# Patient Record
Sex: FEMALE | URBAN - METROPOLITAN AREA
[De-identification: names, ages, dates, MRNs, and addresses within clinical notes are randomized per-mention and may not be internally consistent; named-entity substitution may affect disease eponyms.]

---
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
## 2022-11-12 NOTE — PROGRESS NOTES
[address] and [facility]                                             [address] [facility], [address] [place], [place] [address], [address] [place] [address]        Phone: [phone]                                     Name:  Sree Cantrell  :    2000      Consultants:   Patient Care Team:  Sarah Dangelo DO as PCP - General (Family Medicine)    Chief Complaint:     Sree Cantrell is a 20 y.o. female  who presents today for a New Patient care visit with Personalized Prevention Plan Services as noted below. HPI:     Sree Cantrell is a 21 yo female who presents today to establish care. Anxiety/depression - takes zoloft 25 mg daily. Started in late 2018. Initially started more for depression but in the past year has started to have more anxiety symptoms. Had a life event that caused her a lot of stress and things improved once that resolved. Recently started new job and is just having a lot more anxiety. Does see therapist regularly. Interested in increasing dosage. Ear clogged - left ear x 3 weeks. Ears are chronically waxy. States this happened once before a little over a year ago. Worse then. Popped frequently then but not having as much of that this time. Feels like it has improved some since she first noticed it. Hearing still feels muffled. No drainage or sharp pain. Has tried ear drops on/off without much success. Also tried ear spray without much success. No other URI sxs.      OB/GYN hx  -   - Menarche: 12  - LMP: 21  - Menstrual Period: regular  - Interval Between Menses: once a month  - Duration of Menses: 6 days  - Menstrual Flow: heavy first 2 days then very light  - Bleeding between menses: no  - Pain: no   - Safe Enviroment: yes  - Sexually Active: yes, but never penetrative sex  - No sexual problems  - Contraception: no  - Last pap smear: never  - History of abnormal pap smears: never  - STI History: no concern   - Not sure if she had HPV vaccines      Past medical, surgical, family, and social history reviewed and documented as below. Patient Active Problem List   Diagnosis    Anxiety and depression         Past Medical History:    Past Medical History:   Diagnosis Date    Anxiety     Depression        Past Surgical History:  History reviewed. No pertinent surgical history. Home Meds:  Prior to Visit Medications    Medication Sig Taking?  Authorizing Provider   sertraline (ZOLOFT) 50 MG tablet Take 0.5 tablets by mouth daily Yes Kathe Polo, DO       Allergies:    Nickel    Family History:       Problem Relation Age of Onset    High Blood Pressure Mother     Gout Father     Pancreatic Cancer Maternal Grandmother         62s    Diabetes Maternal Grandfather     Breast Cancer Paternal Grandmother         62s    Ovarian Cancer Paternal Grandmother         62s       Social History:  Social History    None            Health Maintenance Completed:  Health Maintenance   Topic Date Due    HPV vaccine (1 - 2-dose series) Never done    Depression Monitoring  Never done    Pap smear  Never done    Hepatitis C screen  05/01/2023 (Originally 5/1/2017)    HIV screen  05/01/2023 (Originally 5/1/2014)    [address] screen  05/01/2023 (Originally 5/1/2015)    COVID-19 Vaccine (3 - Booster for Swanson Blacklake series) 11/14/2023 (Originally 6/16/2021)    DTaP/Tdap/Td vaccine (8 - Td or Tdap) 05/31/2032    Hepatitis A vaccine  Completed    Hib vaccine  Completed    Varicella vaccine  Completed    Meningococcal (ACWY) vaccine  Completed    Flu vaccine  Completed    Pneumococcal 0-64 years Vaccine  Aged Out          Immunization History   Administered Date(s) Administered    COVID-19, PFIZER PURPLE top, DILUTE for use, (age 15 y+), 30mcg/0.3mL 03/24/2021, 04/21/2021    DTaP, 5 Pertussis Antigens (Daptacel) 1999, 1999, 1999, 11/22/2000, 05/26/2004    Hep B/Hib (Comvax) 2000, 2000, 09/18/2000    Hepatitis A Ped/Adol (Havrix, Vaqta) 05/02/2016, 07/10/2017    Influenza, FLUCELVAX, (age 10 mo+), MDCK, PF, 0.5mL 12/07/2021, 10/31/2022    MMR 05/26/2000, 05/26/2004    Meningococcal MCV4P (Menactra) 05/02/2016    Meningococcal MPSV4 (Menomune) 07/01/2011    Pneumococcal Conjugate 7-valent (Tucker Porteous) 05/26/2000, 09/18/2000    Polio IPV (IPOL) 1999, 1999, 11/22/2000, 05/26/2004    Tdap (Boostrix, Adacel) 07/01/2011, 05/31/2022    Varicella (Varivax) 05/26/2000, 02/16/2009         Review of Systems:  Review of Systems   Constitutional:  Negative for activity change, appetite change, fatigue and fever. HENT:  Positive for hearing loss (L ear muffled). Negative for congestion, ear discharge, ear pain, postnasal drip, rhinorrhea and sinus pressure. Eyes:  Negative for pain and visual disturbance. Respiratory:  Negative for cough, chest tightness and shortness of breath. Cardiovascular:  Negative for chest pain, palpitations and leg swelling. Gastrointestinal:  Negative for abdominal pain, blood in stool, constipation, diarrhea, nausea and vomiting. Endocrine: Negative for polydipsia and polyuria. Genitourinary:  Negative for dysuria, flank pain and frequency. Musculoskeletal:  Negative for back pain and myalgias. Neurological:  Negative for dizziness and light-headedness. Psychiatric/Behavioral:  Negative for dysphoric mood and suicidal ideas. The patient is nervous/anxious. Physical Exam:   Vitals:    11/14/22 0836   BP: 122/82   Site: Left Upper Arm   Position: Sitting   Cuff Size: Large Adult   Pulse: 77   Temp: 97 °F (36.1 °C)   TempSrc: Temporal   SpO2: 97%   Weight: 203 lb 12.8 oz (92.4 kg)   Height: 5' 9.09\" (1.755 m)     Body mass index is 30.01 kg/m². Wt Readings from Last 3 Encounters:   11/14/22 203 lb 12.8 oz (92.4 kg)       BP Readings from Last 3 Encounters:   11/14/22 122/82       Physical Exam  Vitals reviewed. Constitutional:       General: She is not in acute distress.      Appearance: Normal appearance. HENT:      Head: Normocephalic and atraumatic. Right Ear: External ear normal. There is impacted cerumen. Left Ear: External ear normal. There is impacted cerumen. Nose: Nose normal.      Mouth/Throat:      Mouth: Mucous membranes are moist.      Pharynx: Oropharynx is clear. Eyes:      Extraocular Movements: Extraocular movements intact. Pupils: Pupils are equal, round, and reactive to light. Cardiovascular:      Rate and Rhythm: Normal rate and regular rhythm. Pulses: Normal pulses. Heart sounds: Normal heart sounds. Pulmonary:      Effort: Pulmonary effort is normal. No respiratory distress. Breath sounds: Normal breath sounds. Abdominal:      General: Bowel sounds are normal.      Palpations: Abdomen is soft. Tenderness: There is no abdominal tenderness. There is no guarding. Musculoskeletal:         General: No deformity. Cervical back: Neck supple. No tenderness. Right lower leg: No edema. Left lower leg: No edema. Lymphadenopathy:      Cervical: No cervical adenopathy. Skin:     General: Skin is warm and dry. Capillary Refill: Capillary refill takes less than 2 seconds. Findings: No lesion or rash. Neurological:      General: No focal deficit present. Mental Status: She is alert. Mental status is at baseline. Gait: Gait normal.   Psychiatric:         Mood and Affect: Mood normal.         Behavior: Behavior normal.         Thought Content: Thought content normal.         Judgment: Judgment normal.            Lab Review: not applicable       Assessment/Plan:  Nima Anaya was seen today for establish care and anxiety.     Diagnoses and all orders for this visit:    Encounter to establish care  -Medical history reviewed and documented  -Appears up-to-date on immunizations but unsure if she had HPV vaccine, will bring immunization records at visit next month to confirm  -STI testing deferred as patient is unconcerned and has not had penetrative sex at this time  -Pap smear to be completed at annual exam around birthday 5/1/2024    Anxiety and depression  -Depression well controlled, anxiety not well controlled  -PHQ-9 2, QUAN-7 7 today  -Increase zoloft to 50 mg daily   -F/u in 1 month for mood/med check  -     sertraline (ZOLOFT) 50 MG tablet; Take 0.5 tablets by mouth daily    Bilateral impacted cerumen  -Bilateral cerumen impaction noted on exam today  -Ears irrigated and cerumen removed bilaterally, patient tolerated well  -May trial of Debrox drops at home to help with wax buildup  -Follow-up as needed    Family history of breast cancer  Family history of ovarian cancer  -Paternal grandmother had a history of breast and ovarian cancer at the same time, in her 62s  -Patient denies any other family members with breast and/or ovarian cancer  -Referral to breast surgery given today for further evaluation of need for genetic testing  -     ANIBAL - Melchior, [place], DO, Breast Surgery, [facility]      Health Maintenance Due:  Health Maintenance Due   Topic Date Due    HPV vaccine (1 - 2-dose series) Never done    Depression Monitoring  Never done    Pap smear  Never done        Health care decision maker:  <72years old  Vot-ER:  asked and patient declined      Health Maintenance: (USPSTF Recommendations)  (F) Cervical Cancer Screen: (21-29 q3yr cytology alone; 30-65 q3yr cytology alone, q5yr with hrHPV alone, or q5yr cytology+hrHPV (A)): plan to complete with annual visit at birthday 5/2024  HIV Screen: (15-65 yr old, and all pregnant patients (A)): defer  Hep C Screen: (18-79 yr old (B)): defer  Immunizations: UTD except unsure of HPV vaccine status, will bring immunization records to f/u visit     RTC:  Return in about 4 weeks (around 12/12/2022) for mood/med check. EMR Dragon/transcription disclaimer:  Much of this encounter note is electronic transcription/translation of spoken language to printed texts.   The electronic translation of spoken language may be erroneous, or at times, nonsensical words or phrases may be inadvertently transcribed.   Although I have reviewed the note for such errors, some may still exist.       Shelley Castillo DO, PGY-2  [facility]

## 2022-11-14 ENCOUNTER — OFFICE VISIT (OUTPATIENT)
Dept: PRIMARY CARE CLINIC | Age: 22
End: 2022-11-14
Payer: COMMERCIAL

## 2022-11-14 VITALS
SYSTOLIC BLOOD PRESSURE: 122 MMHG | HEART RATE: 77 BPM | DIASTOLIC BLOOD PRESSURE: 82 MMHG | WEIGHT: 203.8 LBS | OXYGEN SATURATION: 97 % | HEIGHT: 69 IN | BODY MASS INDEX: 30.18 KG/M2 | TEMPERATURE: 97 F

## 2022-11-14 DIAGNOSIS — F41.9 ANXIETY AND DEPRESSION: Primary | ICD-10-CM

## 2022-11-14 DIAGNOSIS — F32.A ANXIETY AND DEPRESSION: Primary | ICD-10-CM

## 2022-11-14 DIAGNOSIS — Z76.89 ENCOUNTER TO ESTABLISH CARE: ICD-10-CM

## 2022-11-14 DIAGNOSIS — H61.23 BILATERAL IMPACTED CERUMEN: ICD-10-CM

## 2022-11-14 DIAGNOSIS — Z80.3 FAMILY HISTORY OF BREAST CANCER: ICD-10-CM

## 2022-11-14 DIAGNOSIS — Z80.41 FAMILY HISTORY OF OVARIAN CANCER: ICD-10-CM

## 2022-11-14 RX ORDER — SERTRALINE HYDROCHLORIDE 25 MG/1
25 TABLET, FILM COATED ORAL DAILY
COMMUNITY
Start: 2022-09-26 | End: 2022-11-14 | Stop reason: SDUPTHER

## 2022-11-14 SDOH — ECONOMIC STABILITY: FOOD INSECURITY: WITHIN THE PAST 12 MONTHS, YOU WORRIED THAT YOUR FOOD WOULD RUN OUT BEFORE YOU GOT MONEY TO BUY MORE.: NEVER TRUE

## 2022-11-14 SDOH — ECONOMIC STABILITY: FOOD INSECURITY: WITHIN THE PAST 12 MONTHS, THE FOOD YOU BOUGHT JUST DIDN'T LAST AND YOU DIDN'T HAVE MONEY TO GET MORE.: NEVER TRUE

## 2022-11-14 ASSESSMENT — PATIENT HEALTH QUESTIONNAIRE - PHQ9
8. MOVING OR SPEAKING SO SLOWLY THAT OTHER PEOPLE COULD HAVE NOTICED. OR THE OPPOSITE, BEING SO FIGETY OR RESTLESS THAT YOU HAVE BEEN MOVING AROUND A LOT MORE THAN USUAL: 0
SUM OF ALL RESPONSES TO PHQ9 QUESTIONS 1 & 2: 1
10. IF YOU CHECKED OFF ANY PROBLEMS, HOW DIFFICULT HAVE THESE PROBLEMS MADE IT FOR YOU TO DO YOUR WORK, TAKE CARE OF THINGS AT HOME, OR GET ALONG WITH OTHER PEOPLE: 1
1. LITTLE INTEREST OR PLEASURE IN DOING THINGS: 0
SUM OF ALL RESPONSES TO PHQ QUESTIONS 1-9: 2
5. POOR APPETITE OR OVEREATING: 1
SUM OF ALL RESPONSES TO PHQ QUESTIONS 1-9: 2
3. TROUBLE FALLING OR STAYING ASLEEP: 0
SUM OF ALL RESPONSES TO PHQ QUESTIONS 1-9: 2
7. TROUBLE CONCENTRATING ON THINGS, SUCH AS READING THE NEWSPAPER OR WATCHING TELEVISION: 0
2. FEELING DOWN, DEPRESSED OR HOPELESS: 1
SUM OF ALL RESPONSES TO PHQ QUESTIONS 1-9: 2
9. THOUGHTS THAT YOU WOULD BE BETTER OFF DEAD, OR OF HURTING YOURSELF: 0
6. FEELING BAD ABOUT YOURSELF - OR THAT YOU ARE A FAILURE OR HAVE LET YOURSELF OR YOUR FAMILY DOWN: 0
4. FEELING TIRED OR HAVING LITTLE ENERGY: 0

## 2022-11-14 ASSESSMENT — ANXIETY QUESTIONNAIRES
5. BEING SO RESTLESS THAT IT IS HARD TO SIT STILL: 0
GAD7 TOTAL SCORE: 7
2. NOT BEING ABLE TO STOP OR CONTROL WORRYING: 1
6. BECOMING EASILY ANNOYED OR IRRITABLE: 1
7. FEELING AFRAID AS IF SOMETHING AWFUL MIGHT HAPPEN: 0
4. TROUBLE RELAXING: 2
1. FEELING NERVOUS, ANXIOUS, OR ON EDGE: 1
3. WORRYING TOO MUCH ABOUT DIFFERENT THINGS: 2
IF YOU CHECKED OFF ANY PROBLEMS ON THIS QUESTIONNAIRE, HOW DIFFICULT HAVE THESE PROBLEMS MADE IT FOR YOU TO DO YOUR WORK, TAKE CARE OF THINGS AT HOME, OR GET ALONG WITH OTHER PEOPLE: SOMEWHAT DIFFICULT

## 2022-11-14 ASSESSMENT — ENCOUNTER SYMPTOMS
DIARRHEA: 0
ABDOMINAL PAIN: 0
CHEST TIGHTNESS: 0
SHORTNESS OF BREATH: 0
EYE PAIN: 0
SINUS PRESSURE: 0
NAUSEA: 0
VOMITING: 0
COUGH: 0
BACK PAIN: 0
BLOOD IN STOOL: 0
CONSTIPATION: 0
RHINORRHEA: 0

## 2022-11-14 ASSESSMENT — SOCIAL DETERMINANTS OF HEALTH (SDOH): HOW HARD IS IT FOR YOU TO PAY FOR THE VERY BASICS LIKE FOOD, HOUSING, MEDICAL CARE, AND HEATING?: NOT VERY HARD

## 2023-01-08 NOTE — PROGRESS NOTES
[address] and [facility]                                             [address] [facility], [address] [place], [place] [address], [address] [place] [address]        Phone: [phone]      Name:  Chacha Gipson  :    2000    Consultants:   Patient Care Team:  Ronen Bonilla DO as PCP - General (Family Medicine)    Chief Complaint:     Chacha Gipson is a 20 y.o. female  who presents today for an established patient care visit with Personalized Prevention Plan Services as noted below. HPI:     Chacha Gipson is a 20 y.o. female with anxiety/depression who presents today for mood/med check. At last visit, zoloft dosage increased to 50 mg daily for uncontrolled anxiety. No issues with increasing dose. Feels new dose has helped with anxiety and is appropriate. No increased stress or worsening of mood over the holidays. Continues to follow with therapist regularly. Applied to masters program and excited to potentially start that in the fall. PHQ-9 Total Score: 2 (2023  7:36 AM)  Thoughts that you would be better off dead, or of hurting yourself in some way: 0 (2023  7:36 AM)  QUAN 7 SCORE 2022   QUAN-7 Total Score 3 7     Interpretation of QUAN-7 score: 5-9 = mild anxiety, 10-14 = moderate anxiety, 15+ = severe anxiety. Recommend referral to behavioral health for scores 10 or greater. Not yet seen by breast surgeon but did talk more with family over the holidays about breast/ovarian cancer. Plans to call and make appt soon, need referral reprinted. Patient Active Problem List   Diagnosis    Anxiety and depression         Past Medical History:    Past Medical History:   Diagnosis Date    Anxiety     Depression        Past Surgical History:  No past surgical history on file. Home Meds:  Prior to Visit Medications    Medication Sig Taking?  Authorizing Provider   sertraline (ZOLOFT) 50 MG tablet Take 0.5 tablets by mouth daily  Patient taking differently: Take 50 mg by mouth daily Yes John Common, DO       Allergies:    Nickel    Family History:       Problem Relation Age of Onset    High Blood Pressure Mother     Gout Father     Pancreatic Cancer Maternal Grandmother         62s    Diabetes Maternal Grandfather     Breast Cancer Paternal Grandmother         62s    Ovarian Cancer Paternal Grandmother         62s         Health Maintenance Completed:  Health Maintenance   Topic Date Due    HPV vaccine (1 - 2-dose series) Never done    Pap smear  Never done    Hepatitis C screen  05/01/2023 (Originally 5/1/2017)    HIV screen  05/01/2023 (Originally 5/1/2014)    [address] screen  05/01/2023 (Originally 5/1/2015)    COVID-19 Vaccine (3 - Booster for Swanson Peter series) 11/14/2023 (Originally 6/16/2021)    Depression Monitoring  11/14/2023    DTaP/Tdap/Td vaccine (8 - Td or Tdap) 05/31/2032    Hepatitis A vaccine  Completed    Hib vaccine  Completed    Varicella vaccine  Completed    Meningococcal (ACWY) vaccine  Completed    Flu vaccine  Completed    Pneumococcal 0-64 years Vaccine  Aged Out          Immunization History   Administered Date(s) Administered    COVID-19, PFIZER PURPLE top, DILUTE for use, (age 15 y+), 30mcg/0.3mL 03/24/2021, 04/21/2021    DTaP, 5 Pertussis Antigens (Daptacel) 1999, 1999, 1999, 11/22/2000, 05/26/2004    Hep B/Hib (Comvax) 1999, 1999, 09/18/2000    Hepatitis A Ped/Adol (Havrix, Vaqta) 05/02/2016, 07/10/2017    Influenza, FLUCELVAX, (age 10 mo+), MDCK, PF, 0.5mL 12/07/2021, 10/31/2022    MMR 05/26/2000, 05/26/2004    Meningococcal MCV4P (Menactra) 05/02/2016    Meningococcal MPSV4 (Menomune) 07/01/2011    Pneumococcal Conjugate 7-valent (Estela Hench) 05/26/2000, 09/18/2000    Polio IPV (IPOL) 1999, 1999, 11/22/2000, 05/26/2004    Tdap (Boostrix, Adacel) 07/01/2011, 05/31/2022    Varicella (Varivax) 05/26/2000, 02/16/2009         Review of Systems:  Review of Systems   Constitutional:  Negative for activity change, appetite change, fatigue and fever. HENT:  Negative for congestion, rhinorrhea and sinus pressure. Eyes:  Negative for pain and visual disturbance. Respiratory:  Negative for cough, chest tightness and shortness of breath. Cardiovascular:  Negative for chest pain, palpitations and leg swelling. Gastrointestinal:  Negative for abdominal pain, blood in stool, constipation, diarrhea, nausea and vomiting. Endocrine: Negative for polydipsia and polyuria. Genitourinary:  Negative for dysuria, flank pain and frequency. Musculoskeletal:  Negative for back pain and myalgias. Neurological:  Negative for dizziness and light-headedness. Psychiatric/Behavioral:  Negative for dysphoric mood. The patient is not nervous/anxious. Physical Exam:   Vitals:    01/09/23 0732   BP: 102/64   Pulse: 64   Temp: 98.9 °F (37.2 °C)   SpO2: 98%   Weight: 204 lb (92.5 kg)   Height: 5' 9.09\" (1.755 m)     Body mass index is 30.05 kg/m². Wt Readings from Last 3 Encounters:   01/09/23 204 lb (92.5 kg)   11/14/22 203 lb 12.8 oz (92.4 kg)       BP Readings from Last 3 Encounters:   01/09/23 102/64   11/14/22 122/82       Physical Exam  Vitals reviewed. Constitutional:       General: She is not in acute distress. Appearance: Normal appearance. HENT:      Head: Normocephalic and atraumatic. Eyes:      Extraocular Movements: Extraocular movements intact. Pupils: Pupils are equal, round, and reactive to light. Cardiovascular:      Rate and Rhythm: Normal rate and regular rhythm. Pulses: Normal pulses. Heart sounds: Normal heart sounds. Pulmonary:      Effort: Pulmonary effort is normal. No respiratory distress. Breath sounds: Normal breath sounds. Abdominal:      Palpations: Abdomen is soft. Tenderness: There is no abdominal tenderness. There is no guarding. Musculoskeletal:         General: No deformity.       Cervical back: Neck supple. No tenderness. Right lower leg: No edema. Left lower leg: No edema. Lymphadenopathy:      Cervical: No cervical adenopathy. Skin:     General: Skin is warm and dry. Capillary Refill: Capillary refill takes less than 2 seconds. Findings: No lesion or rash. Neurological:      General: No focal deficit present. Mental Status: She is alert. Mental status is at baseline. Gait: Gait normal.   Psychiatric:         Mood and Affect: Mood normal.         Behavior: Behavior normal.         Thought Content: Thought content normal.         Judgment: Judgment normal.            Lab Review:   No results found for any previous visit. Assessment/Plan:  Elio Vang was seen today for anxiety and depression. Diagnoses and all orders for this visit:    Anxiety and depression  Medication management  -well controlled, at goal  -PHQ9 2, GAD7 3 today  -cont zoloft 50 mg daily  -cont therapy as scheduled    Family history of breast cancer  Family history of ovarian cancer  -paternal grandmother had a history of breast and ovarian cancer at the same time, in her 62s  -referral to breast surgery reprinted today     Health Maintenance Due:  Health Maintenance Due   Topic Date Due    HPV vaccine (1 - 2-dose series) Never done    Pap smear  Never done          Health care decision maker:  <72years old  Vot-ER:  asked and patient declined      Health Maintenance: (USPSTF Recommendations)  F) Cervical Cancer Screen: (21-29 q3yr cytology alone; 30-65 q3yr cytology alone, q5yr with hrHPV alone, or q5yr cytology+hrHPV (A)): plan to complete with annual visit at birthday 5/2024  HIV Screen: (15-65 yr old, and all pregnant patients (A)): defer  Hep C Screen: (21-70 yr old (B)): defer  Immunizations: UTD except unsure of HPV vaccine status, will bring immunization records to f/u visit     RTC:  Return in about 4 months (around 5/9/2023) for pap.      EMR Dragon/transcription disclaimer:  Much of this encounter note is electronic transcription/translation of spoken language to printed texts. The electronic translation of spoken language may be erroneous, or at times, nonsensical words or phrases may be inadvertently transcribed.   Although I have reviewed the note for such errors, some may still exist.       Evelyn Murray DO, PGY-2  [facility] Medicine [facility]

## 2023-01-09 ENCOUNTER — OFFICE VISIT (OUTPATIENT)
Dept: PRIMARY CARE CLINIC | Age: 23
End: 2023-01-09
Payer: COMMERCIAL

## 2023-01-09 VITALS
SYSTOLIC BLOOD PRESSURE: 102 MMHG | OXYGEN SATURATION: 98 % | HEART RATE: 64 BPM | BODY MASS INDEX: 30.21 KG/M2 | TEMPERATURE: 98.9 F | HEIGHT: 69 IN | WEIGHT: 204 LBS | DIASTOLIC BLOOD PRESSURE: 64 MMHG

## 2023-01-09 DIAGNOSIS — F32.A ANXIETY AND DEPRESSION: Primary | ICD-10-CM

## 2023-01-09 DIAGNOSIS — F41.9 ANXIETY AND DEPRESSION: Primary | ICD-10-CM

## 2023-01-09 DIAGNOSIS — Z79.899 MEDICATION MANAGEMENT: ICD-10-CM

## 2023-01-09 DIAGNOSIS — Z80.41 FAMILY HISTORY OF OVARIAN CANCER: ICD-10-CM

## 2023-01-09 DIAGNOSIS — Z80.3 FAMILY HISTORY OF BREAST CANCER: ICD-10-CM

## 2023-01-09 PROCEDURE — 99213 OFFICE O/P EST LOW 20 MIN: CPT

## 2023-01-09 RX ORDER — FLUTICASONE PROPIONATE 50 MCG
SPRAY, SUSPENSION (ML) NASAL
COMMUNITY
End: 2023-01-09

## 2023-01-09 RX ORDER — CETIRIZINE HYDROCHLORIDE 10 MG/1
TABLET ORAL
COMMUNITY
End: 2023-01-09

## 2023-01-09 ASSESSMENT — PATIENT HEALTH QUESTIONNAIRE - PHQ9
SUM OF ALL RESPONSES TO PHQ QUESTIONS 1-9: 2
SUM OF ALL RESPONSES TO PHQ QUESTIONS 1-9: 2
6. FEELING BAD ABOUT YOURSELF - OR THAT YOU ARE A FAILURE OR HAVE LET YOURSELF OR YOUR FAMILY DOWN: 0
7. TROUBLE CONCENTRATING ON THINGS, SUCH AS READING THE NEWSPAPER OR WATCHING TELEVISION: 0
SUM OF ALL RESPONSES TO PHQ9 QUESTIONS 1 & 2: 1
8. MOVING OR SPEAKING SO SLOWLY THAT OTHER PEOPLE COULD HAVE NOTICED. OR THE OPPOSITE, BEING SO FIGETY OR RESTLESS THAT YOU HAVE BEEN MOVING AROUND A LOT MORE THAN USUAL: 0
3. TROUBLE FALLING OR STAYING ASLEEP: 0
10. IF YOU CHECKED OFF ANY PROBLEMS, HOW DIFFICULT HAVE THESE PROBLEMS MADE IT FOR YOU TO DO YOUR WORK, TAKE CARE OF THINGS AT HOME, OR GET ALONG WITH OTHER PEOPLE: 0
1. LITTLE INTEREST OR PLEASURE IN DOING THINGS: 0
9. THOUGHTS THAT YOU WOULD BE BETTER OFF DEAD, OR OF HURTING YOURSELF: 0
5. POOR APPETITE OR OVEREATING: 1
2. FEELING DOWN, DEPRESSED OR HOPELESS: 1
SUM OF ALL RESPONSES TO PHQ QUESTIONS 1-9: 2
SUM OF ALL RESPONSES TO PHQ QUESTIONS 1-9: 2
4. FEELING TIRED OR HAVING LITTLE ENERGY: 0

## 2023-01-09 ASSESSMENT — ANXIETY QUESTIONNAIRES
4. TROUBLE RELAXING: 1
7. FEELING AFRAID AS IF SOMETHING AWFUL MIGHT HAPPEN: 0
IF YOU CHECKED OFF ANY PROBLEMS ON THIS QUESTIONNAIRE, HOW DIFFICULT HAVE THESE PROBLEMS MADE IT FOR YOU TO DO YOUR WORK, TAKE CARE OF THINGS AT HOME, OR GET ALONG WITH OTHER PEOPLE: NOT DIFFICULT AT ALL
6. BECOMING EASILY ANNOYED OR IRRITABLE: 0
3. WORRYING TOO MUCH ABOUT DIFFERENT THINGS: 0
1. FEELING NERVOUS, ANXIOUS, OR ON EDGE: 1
2. NOT BEING ABLE TO STOP OR CONTROL WORRYING: 0
GAD7 TOTAL SCORE: 3
5. BEING SO RESTLESS THAT IT IS HARD TO SIT STILL: 1

## 2023-01-09 ASSESSMENT — ENCOUNTER SYMPTOMS
SINUS PRESSURE: 0
VOMITING: 0
SHORTNESS OF BREATH: 0
DIARRHEA: 0
RHINORRHEA: 0
BLOOD IN STOOL: 0
BACK PAIN: 0
CHEST TIGHTNESS: 0
NAUSEA: 0
CONSTIPATION: 0
EYE PAIN: 0
COUGH: 0
ABDOMINAL PAIN: 0

## 2023-10-12 DIAGNOSIS — F41.9 ANXIETY AND DEPRESSION: ICD-10-CM

## 2023-10-12 DIAGNOSIS — F32.A ANXIETY AND DEPRESSION: ICD-10-CM

## 2023-10-12 RX ORDER — SERTRALINE HYDROCHLORIDE 100 MG/1
100 TABLET, FILM COATED ORAL DAILY
Qty: 90 TABLET | Refills: 1 | Status: SHIPPED | OUTPATIENT
Start: 2023-10-12

## 2023-10-12 NOTE — TELEPHONE ENCOUNTER
Refill Request       Last Seen: Last Seen Department: 4/13/2023  Last Seen by PCP: 4/13/2023    Last Written: 04/13/23 qty 90 w/ 1    Next Appointment:   No future appointments. Message to [address] [place], [place] [address]all [facility] to schedule appointment.          Requested Prescriptions     Pending Prescriptions Disp Refills    sertraline (ZOLOFT) 100 MG tablet [Pharmacy Med Name: SERTRALINE 100MG TABLETS] 90 tablet 1     Sig: TAKE 1 TABLET BY MOUTH DAILY

## 2023-12-05 ENCOUNTER — OFFICE VISIT (OUTPATIENT)
Dept: PRIMARY CARE CLINIC | Age: 23
End: 2023-12-05
Payer: COMMERCIAL

## 2023-12-05 VITALS
TEMPERATURE: 97.2 F | HEIGHT: 69 IN | DIASTOLIC BLOOD PRESSURE: 70 MMHG | SYSTOLIC BLOOD PRESSURE: 112 MMHG | BODY MASS INDEX: 30.51 KG/M2 | HEART RATE: 64 BPM | RESPIRATION RATE: 16 BRPM | WEIGHT: 206 LBS | OXYGEN SATURATION: 98 %

## 2023-12-05 DIAGNOSIS — F32.A ANXIETY AND DEPRESSION: ICD-10-CM

## 2023-12-05 DIAGNOSIS — Z01.419 WELL WOMAN EXAM: ICD-10-CM

## 2023-12-05 DIAGNOSIS — R21 RASH: ICD-10-CM

## 2023-12-05 DIAGNOSIS — F41.9 ANXIETY AND DEPRESSION: ICD-10-CM

## 2023-12-05 DIAGNOSIS — Z00.00 HEALTHCARE MAINTENANCE: Primary | ICD-10-CM

## 2023-12-05 PROCEDURE — 99395 PREV VISIT EST AGE 18-39: CPT | Performed by: FAMILY MEDICINE

## 2023-12-05 RX ORDER — PRENATAL VIT 91/IRON/FOLIC/DHA 28-975-200
COMBINATION PACKAGE (EA) ORAL
Qty: 15 G | Refills: 1 | Status: SHIPPED | OUTPATIENT
Start: 2023-12-05

## 2023-12-05 SDOH — ECONOMIC STABILITY: INCOME INSECURITY: HOW HARD IS IT FOR YOU TO PAY FOR THE VERY BASICS LIKE FOOD, HOUSING, MEDICAL CARE, AND HEATING?: NOT HARD AT ALL

## 2023-12-05 SDOH — ECONOMIC STABILITY: FOOD INSECURITY: WITHIN THE PAST 12 MONTHS, YOU WORRIED THAT YOUR FOOD WOULD RUN OUT BEFORE YOU GOT MONEY TO BUY MORE.: NEVER TRUE

## 2023-12-05 SDOH — ECONOMIC STABILITY: FOOD INSECURITY: WITHIN THE PAST 12 MONTHS, THE FOOD YOU BOUGHT JUST DIDN'T LAST AND YOU DIDN'T HAVE MONEY TO GET MORE.: NEVER TRUE

## 2023-12-05 ASSESSMENT — ENCOUNTER SYMPTOMS
VOMITING: 0
SHORTNESS OF BREATH: 0
COLOR CHANGE: 0
ABDOMINAL PAIN: 0
DIARRHEA: 0
NAUSEA: 0
BLOOD IN STOOL: 0
COUGH: 0
RHINORRHEA: 0

## 2023-12-05 ASSESSMENT — PATIENT HEALTH QUESTIONNAIRE - PHQ9
8. MOVING OR SPEAKING SO SLOWLY THAT OTHER PEOPLE COULD HAVE NOTICED. OR THE OPPOSITE, BEING SO FIGETY OR RESTLESS THAT YOU HAVE BEEN MOVING AROUND A LOT MORE THAN USUAL: 0
1. LITTLE INTEREST OR PLEASURE IN DOING THINGS: 1
SUM OF ALL RESPONSES TO PHQ QUESTIONS 1-9: 5
SUM OF ALL RESPONSES TO PHQ9 QUESTIONS 1 & 2: 2
4. FEELING TIRED OR HAVING LITTLE ENERGY: 1
SUM OF ALL RESPONSES TO PHQ QUESTIONS 1-9: 5
SUM OF ALL RESPONSES TO PHQ QUESTIONS 1-9: 5
10. IF YOU CHECKED OFF ANY PROBLEMS, HOW DIFFICULT HAVE THESE PROBLEMS MADE IT FOR YOU TO DO YOUR WORK, TAKE CARE OF THINGS AT HOME, OR GET ALONG WITH OTHER PEOPLE: 0
7. TROUBLE CONCENTRATING ON THINGS, SUCH AS READING THE NEWSPAPER OR WATCHING TELEVISION: 0
2. FEELING DOWN, DEPRESSED OR HOPELESS: 1
6. FEELING BAD ABOUT YOURSELF - OR THAT YOU ARE A FAILURE OR HAVE LET YOURSELF OR YOUR FAMILY DOWN: 0
SUM OF ALL RESPONSES TO PHQ QUESTIONS 1-9: 5
5. POOR APPETITE OR OVEREATING: 1
9. THOUGHTS THAT YOU WOULD BE BETTER OFF DEAD, OR OF HURTING YOURSELF: 0
3. TROUBLE FALLING OR STAYING ASLEEP: 1

## 2023-12-05 NOTE — PATIENT INSTRUCTIONS
Reminder: Please call to schedule dental and eye exams when able. Consider HPV vaccine series. Lifestyle modifications discussed today:    Exercise: In accordance with AHA/ACC guidelines:    - Get at least 150 minutes per week of moderate-intensity aerobic activity OR 75 minutes per week of vigorous aerobic activity, OR a combination of both, preferably spread throughout the week. - Add moderate- to high-intensity muscle-strengthening activity (such as resistance or weights) on at least 2 days per week. - Gain even more benefits by being active at least 300 minutes (5 hours) per week. Increase amount and intensity gradually over time. Diet:    Dietary pattern should consist of vegetables, fruits and whole grains, low-fat dairy (or no dairy if intolerant), suggested poultry and fish. Consider legumes, vegetable oils and nuts and limiting intake of sweets, sugar, sweetened beverages and red meat. Aim is to have 5% of calories from saturated fats or less and no trans fat. The National Suicide Prevention Lifeline is a [place]-based suicide prevention network of 161 crisis centers that provides a 24/7, toll-free hotline available to anyone in suicidal crisis or emotional distress. The number is: 1-791-684-907-685-8358. Patient education: Serotonin syndrome (The Basics)  Written by the doctors and editors at [facility]    What is serotonin syndrome? Serotonin syndrome is a problem that can happen after taking certain medicines. It is uncommon but can be serious or even deadly if it does happen. Serotonin syndrome causes symptoms such as:    ?Feeling anxious, restless, or confused    ? Sweating    ? Muscle spasms or muscles that cannot relax normally    ? Very fast back-and-forth eye movements    ? Shaking or trembling    ? Fever    ? A fast heartbeat    ? Vomiting    ? Diarrhea    What causes serotonin syndrome?     Serotonin syndrome can happen to people after they take certain medicines or

## 2023-12-05 NOTE — PROGRESS NOTES
for serotonin syndrome. Call back/ED precautions reviewed. 4. Rash  Assessment & Plan:  Poorly controlled, appears to be tinea versicolor. Rx for 1 week tx of topical terbinafine 1%. Advised to keep area dry. Call back/ED precautions discussed. Orders:  -     terbinafine (LAMISIL) 1 % cream; Apply topically 2 times daily to affected areas. , Disp-15 g, R-1, Normal      Education:  Routine anticipatory guidance provided. Other applicable patient education information provided in AVS.    Counseling  The patient was counseled regarding motor vehicle safety and sun exposure. If needed, the patient was counseled regarding diet and exercise. The patient was given information regarding the dangers of smoking and the overuse of alcohol. The patient was counseled regarding Living Will/Durable Power-Of-. Detail Level: Detailed Hide Include Location In Plan Question?: No Include Location In Plan?: Yes

## 2023-12-05 NOTE — ASSESSMENT & PLAN NOTE
Overall doing okay. Reminded to schedule dental and eye exams when able. Counseled briefly and provided written materials on recommendations for exercise and healthy diet. Other problems addressed today as otherwise noted.

## 2023-12-05 NOTE — ASSESSMENT & PLAN NOTE
Poorly controlled, appears to be tinea versicolor. Rx for 1 week tx of topical terbinafine 1%. Advised to keep area dry. Call back/ED precautions discussed.

## 2023-12-05 NOTE — ASSESSMENT & PLAN NOTE
Well controlled, follows with therapist PRN. PHQ9 score of 5, MDQ negative. Denied SI/HI today. I am fine to fill her SSRI as she is low risk. I advised that should she need more tailoring of this medication (up, down or off) would need psychiatry referral. She was amenable to this. Cautioned for SI risk and SI Hotline number provided today as well as for serotonin syndrome. Call back/ED precautions reviewed.

## (undated) NOTE — LETTER
VACCINE ADMINISTRATION RECORD  PARENT / GUARDIAN APPROVAL  Date: 7/10/2017  Vaccine administered to: Fatimah Saravia     : 2000    MRN: [id number]    A copy of the appropriate Centers for Disease Control and Prevention Vaccine Information statement

## (undated) NOTE — LETTER
[facility] [employer] of Child Health Examination       Student's Name  Karen Macias Da Title                           Date     Signature HEALTH HISTORY          TO BE COMPLETED AND SIGNED BY PARENT/GUARDIAN AND VERIFIED BY HEALTH CARE PROVIDER    ALLERGIES  (Food, drug, insect, other)  Review of patient's allergies indicates no known allergies.  MEDICATION  (List all prescribed or taken on a PHYSICAL EXAMINATION REQUIREMENTS    Entire section below to be completed by MD/DO/APN/PA       PHYSICAL EXAMINATION REQUIREMENTS (head circumference if <33 years old):   /68   Pulse 60   Ht 5' 8.5\" (1.74 m)   Wt 64.8 kg (142 lb 14.4 oz)   BMI 21. 4 Mouth/Dental Yes  Spinal examination Yes    Cardiovascular/HTN Yes  Nutritional status Yes    Respiratory Yes                   Diagnosis of Asthma: No Mental Health Yes        Currently Prescribed Asthma Medication:            Quick-relief  medication (e.

## (undated) NOTE — LETTER
7/10/2017              Wilmer Redmond        [address] [place] [address]       Immunization History   Administered Date(s) Administered   • DTAP 07/01/1999, 09/01/1999, 10/27/1999, 11/22/2000, 05/26/2004   • HEP A,Ped/Adol,(2 D